# Patient Record
Sex: MALE | Race: BLACK OR AFRICAN AMERICAN | NOT HISPANIC OR LATINO | Employment: FULL TIME | URBAN - METROPOLITAN AREA
[De-identification: names, ages, dates, MRNs, and addresses within clinical notes are randomized per-mention and may not be internally consistent; named-entity substitution may affect disease eponyms.]

---

## 2017-03-10 ENCOUNTER — APPOINTMENT (OUTPATIENT)
Dept: GENERAL RADIOLOGY | Facility: HOSPITAL | Age: 32
End: 2017-03-10

## 2017-03-10 ENCOUNTER — HOSPITAL ENCOUNTER (EMERGENCY)
Facility: HOSPITAL | Age: 32
Discharge: HOME | End: 2017-03-10
Attending: EMERGENCY MEDICINE | Admitting: EMERGENCY MEDICINE

## 2017-03-10 VITALS
HEART RATE: 84 BPM | HEIGHT: 73 IN | DIASTOLIC BLOOD PRESSURE: 87 MMHG | WEIGHT: 225 LBS | BODY MASS INDEX: 29.82 KG/M2 | RESPIRATION RATE: 16 BRPM | TEMPERATURE: 98.3 F | SYSTOLIC BLOOD PRESSURE: 132 MMHG | OXYGEN SATURATION: 97 %

## 2017-03-10 DIAGNOSIS — J06.9 UPPER RESPIRATORY TRACT INFECTION, UNSPECIFIED TYPE: Primary | ICD-10-CM

## 2017-03-10 PROCEDURE — 71020 HC CHEST PA AND LATERAL: CPT

## 2017-03-10 PROCEDURE — 99283 EMERGENCY DEPT VISIT LOW MDM: CPT

## 2017-03-10 RX ORDER — GUAIFENESIN AND CODEINE PHOSPHATE 100; 10 MG/5ML; MG/5ML
5 SOLUTION ORAL 3 TIMES DAILY PRN
Qty: 118 ML | Refills: 0 | Status: SHIPPED | OUTPATIENT
Start: 2017-03-10

## 2017-03-10 NOTE — ED PROVIDER NOTES
EMERGENCY DEPARTMENT ENCOUNTER       CHIEF COMPLAINT  Chief Complaint: Cough  History given by: Patient  History limited by: N/A  Room Number: 12/12  PMD: No Known Provider      HPI:  HPI Comments: Pt c/o productive cough with clear/white sputum onset last night. Pt has also had chills and post-tussive emesis, but denies CP, dyspnea, sore throat, documented fever, and BLE edema/pain. Pt reports that he has had recent sick contact with someone with similar sx. Pt reports that he smokes cigarettes daily. There are no other complaints at this time.     Patient is a 31 y.o. male presenting with cough.   Cough   Cough characteristics:  Productive  Sputum characteristics:  Clear and white  Severity:  Mild  Onset quality:  Gradual  Duration: onset last night.  Timing:  Intermittent  Progression:  Waxing and waning  Smoker: yes    Context: smoke exposure    Context: not weather changes    Relieved by:  Nothing  Worsened by:  Nothing  Associated symptoms: chills    Associated symptoms: no chest pain, no fever (pt denies documented fever), no headaches, no myalgias, no rhinorrhea, no shortness of breath, no sore throat and no wheezing          PAST MEDICAL HISTORY  Active Ambulatory Problems     Diagnosis Date Noted   • No Active Ambulatory Problems     Resolved Ambulatory Problems     Diagnosis Date Noted   • No Resolved Ambulatory Problems     No Additional Past Medical History         PAST SURGICAL HISTORY  Past Surgical History   Procedure Laterality Date   • Tooth extraction           FAMILY HISTORY  History reviewed. No pertinent family history.      SOCIAL HISTORY  Social History     Social History   • Marital status: Single     Spouse name: N/A   • Number of children: N/A   • Years of education: N/A     Occupational History   • Not on file.     Social History Main Topics   • Smoking status: Current Every Day Smoker     Packs/day: 0.50     Types: Cigarettes   • Smokeless tobacco: Not on file   • Alcohol use Yes       Comment: occasional   • Drug use: No   • Sexual activity: Not on file     Other Topics Concern   • Not on file     Social History Narrative         ALLERGIES  Aspirin        REVIEW OF SYSTEMS  Review of Systems   Constitutional: Positive for chills. Negative for fever (pt denies documented fever).   HENT: Negative for rhinorrhea and sore throat.    Eyes: Negative for visual disturbance.   Respiratory: Positive for cough. Negative for shortness of breath and wheezing.    Cardiovascular: Negative for chest pain.   Gastrointestinal: Positive for nausea and vomiting (post-tussive emesis). Negative for abdominal pain.   Musculoskeletal: Negative for myalgias and neck pain.   Skin: Negative.    Neurological: Negative for syncope, weakness, numbness and headaches.   Psychiatric/Behavioral: Negative.    All other systems reviewed and are negative.           PHYSICAL EXAM  ED Triage Vitals   Temp Heart Rate Resp BP SpO2   03/10/17 0406 03/10/17 0406 03/10/17 0406 03/10/17 0411 03/10/17 0406   97.5 °F (36.4 °C) 69 16 150/94 97 % WNL      Temp src Heart Rate Source Patient Position BP Location FiO2 (%)   03/10/17 0505 -- -- -- --   Oral           Physical Exam   Constitutional: He is oriented to person, place, and time and well-developed, well-nourished, and in no distress.   Eyes: EOM are normal.   Neck: Normal range of motion.   Cardiovascular: Normal rate and regular rhythm.    Pulmonary/Chest: Effort normal and breath sounds normal. No respiratory distress.   Neurological: He is alert and oriented to person, place, and time. He has normal sensation and normal strength.   Skin: Skin is warm and dry.   Psychiatric: Affect normal.   Nursing note and vitals reviewed.          RADIOLOGY  XR Chest 2 View   Preliminary Result   No acute findings.  Interpreted by radiologist. Independently viewed by me.                    Ordered the above noted radiological studies. Reviewed by me in PACS.        PROCEDURES  Procedures        PROGRESS AND CONSULTS  ED Course     5:38 AM: Informed pt that his CXR is negative acute. Will prescribe rx for anti-tussive and provide [facility] referral for f/u. RTER warnings given. Pt agrees with plan for discharge.             MEDICAL DECISION MAKING      MDM  Number of Diagnoses or Management Options  Upper respiratory tract infection, unspecified type:      Amount and/or Complexity of Data Reviewed  Tests in the radiology section of CPT®: reviewed and ordered (CXR is negative acute. )    Patient Progress  Patient progress: stable             DIAGNOSIS  Final diagnoses:   Upper respiratory tract infection, unspecified type         DISPOSITION  Pt discharged.    DISCHARGE    Patient discharged in stable condition.    Reviewed implications of results, diagnosis, meds, responsibility to follow up, warning signs and symptoms of possible worsening, potential complications and reasons to return to ER.    Patient/Family voiced understanding of above instructions.    Discussed plan for discharge, as there is no emergent indication for admission.  Pt/family is agreeable and understands need for follow up and repeat testing.  Pt is aware that discharge does not mean that nothing is wrong but it indicates no emergency is present that requires admission and they must continue care with follow-up as given below or physician of their choice.     FOLLOW-UP  [facility]YSICAN REFERRAL SERVICE  [facility] [id number] [phone]  Schedule an appointment as soon as possible for a visit           Medication List      Current Discharge Medication List      START taking these medications    Details   guaifenesin-codeine (GUAIFENESIN AC) 100-10 MG/5ML liquid Take 5 mL by mouth 3 (Three) Times a Day As Needed for cough.  Qty: 118 mL, Refills: 0                 Latest Documented Vital Signs:  As of 5:41 AM  BP- 132/87 HR- 84 Temp- 98.3 °F (36.8 °C) (Oral) O2 sat-  97%        --  Documentation assistance provided by rain Wilson for Dr. Lobo MD.  Information recorded by the scribe was done at my direction and has been verified and validated by me.       Clay Wilson  03/10/17 0545       Mack Diamond MD  03/10/17 0704